# Patient Record
Sex: MALE | Race: WHITE
[De-identification: names, ages, dates, MRNs, and addresses within clinical notes are randomized per-mention and may not be internally consistent; named-entity substitution may affect disease eponyms.]

---

## 2018-03-06 ENCOUNTER — HOSPITAL ENCOUNTER (EMERGENCY)
Dept: HOSPITAL 17 - NED | Age: 23
Discharge: LEFT BEFORE DISCHARGE | End: 2018-03-06
Payer: SELF-PAY

## 2018-03-06 VITALS
RESPIRATION RATE: 15 BRPM | OXYGEN SATURATION: 98 % | TEMPERATURE: 98.5 F | SYSTOLIC BLOOD PRESSURE: 146 MMHG | HEART RATE: 101 BPM | DIASTOLIC BLOOD PRESSURE: 97 MMHG
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
## 2018-03-06 DIAGNOSIS — S51.812A: Primary | ICD-10-CM

## 2018-03-06 PROCEDURE — 99283 EMERGENCY DEPT VISIT LOW MDM: CPT

## 2018-03-06 PROCEDURE — 73080 X-RAY EXAM OF ELBOW: CPT

## 2018-03-06 NOTE — PD
HPI


Chief Complaint:  Laceration/Skin Injury


Time Seen by Provider:  17:49


Travel History


International Travel<30 days:  No


Contact w/Intl Traveler<30days:  No


Traveled to known affect area:  No





History of Present Illness


HPI


22-year-old male presents to the emergency department for evaluation of a 

laceration on the posterior proximal left forearm.  Patient states he tripped, 

striking his forearm on a curb.  Did not strike his head or lose consciousness.

  Patient came here for repair of his laceration.  Pain is moderate in 

severity.  No limitation in range of motion alterations in sensation.





[facility]


Past Medical History


Medical History:  Denies Significant Hx





Social History


Alcohol Use:  Yes


Tobacco Use:  Yes





Allergies-Medications


(Allergen,Severity, Reaction):  


Coded Allergies:  


     No Known Allergies (Unverified , 3/6/18)





Review of Systems


Except as stated in HPI:  all other systems reviewed are Neg





Physical Exam


Narrative


GENERAL: Well-nourished male patient in no acute distress.


SKIN: Focused skin assessment warm/dry.  3 cm laceration on the posterior 

aspect of the proximal left forearm.  Bleeding is controlled.


HEAD: Atraumatic. Normocephalic. 


EYES: Pupils equal and round. No scleral icterus. No injection or drainage. 


ENT: No nasal bleeding or discharge.  Mucous membranes pink and moist.


NECK: Trachea midline.  


CARDIOVASCULAR: Regular rate 


RESPIRATORY: No accessory muscle use. 


MUSCULOSKELETAL: No obvious deformities. No clubbing.  No cyanosis.  No edema.  

Patient has full flexion, extension, supination, pronation of the left elbow/

forearm.  Plus  strength bilateral extremities.


NEUROLOGICAL: Awake and alert. No obvious cranial nerve deficits.  Motor 

grossly within normal limits. Normal speech.





Data


Data


Last Documented VS





Vital Signs








  Date Time  Temp Pulse Resp B/P (MAP) Pulse Ox O2 Delivery O2 Flow Rate FiO2


 


3/6/18 17:49 98.5 101 15 146/97 (113) 98   








Orders





 Orders


Elbow, Complete (4 Vws) (3/6/18 )








[facility]


Medical Decision Making


Medical Screen Exam Complete:  Yes


Emergency Medical Condition:  Yes


Medical Record Reviewed:  Yes


Differential Diagnosis


Laceration superficial versus deep versus abrasion versus avulsion versus 

fracture versus foreign body


Narrative Course


22-year-old male presents to emergency department for evaluation of a 

laceration on his left forearm.  X-ray imaging is ordered in triage.  Prior to 

placement, patient chooses to leave.


AMA: The risks of leaving against medical advice without further evaluation 

treatment were discussed with the patient.  These risks include cardiac 

dysfunction, cardiac dysrhythmia, possible heart attack, possible stroke or 

death.  The patient indicated understanding of these risks and appeared to have 

the capacity to make this decision.





Diagnosis





 Primary Impression:  


 Laceration of arm


 Qualified Codes:  S41.112A - Laceration without foreign body of left upper arm

, initial encounter


Disposition:  07 AGAINST MEDICAL ADVICE


Condition:  Stable











Kena Briseno Mar 6, 2018 18:22

## 2018-03-06 NOTE — RADRPT
EXAM DATE/TIME:  03/06/2018 18:03 

 

[facility] COMPARISON:     

No previous studies available for comparison.

 

                     

INDICATIONS :     

Short of breath.

                     

 

MEDICAL HISTORY :     

None.          

 

SURGICAL HISTORY :     

None.   

 

ENCOUNTER:     

Initial                                        

 

ACUITY:     

1 day      

 

PAIN SCORE:     

0/10

 

LOCATION:     

Left  elbow

 

FINDINGS:     

Multiple view examination of the left elbow demonstrates no periosteal reaction, joint effusion, or f
racture.  The osseous structures are in normal alignment.  Bony mineralization is normal there is a f
ocal lucency in the cutaneous soft tissues of the proximal forearm seen on the lateral view.  No radi
opaque foreign bodies..

 

CONCLUSION:     

Osseous structures of the elbow are intact.

 

 

 

 

 Ron Boone MD on March 06, 2018 at 18:12           

Board Certified Radiologist.

 This report was verified electronically.